# Patient Record
Sex: FEMALE | Race: OTHER | HISPANIC OR LATINO | URBAN - METROPOLITAN AREA
[De-identification: names, ages, dates, MRNs, and addresses within clinical notes are randomized per-mention and may not be internally consistent; named-entity substitution may affect disease eponyms.]

---

## 2024-02-23 ENCOUNTER — APPOINTMENT (RX ONLY)
Dept: URBAN - METROPOLITAN AREA CLINIC 187 | Facility: CLINIC | Age: 54
Setting detail: DERMATOLOGY
End: 2024-02-23

## 2024-02-23 DIAGNOSIS — L65.9 NONSCARRING HAIR LOSS, UNSPECIFIED: ICD-10-CM | Status: WORSENING

## 2024-02-23 DIAGNOSIS — L81.4 OTHER MELANIN HYPERPIGMENTATION: ICD-10-CM | Status: UNCHANGED

## 2024-02-23 PROCEDURE — ? PRESCRIPTION MEDICATION MANAGEMENT

## 2024-02-23 PROCEDURE — ? LAB REPORTS REVIEWED

## 2024-02-23 PROCEDURE — ? OBSERVATION

## 2024-02-23 PROCEDURE — ? COUNSELING

## 2024-02-23 PROCEDURE — ? ORDER TESTS

## 2024-02-23 PROCEDURE — ? DIAGNOSIS COMMENT

## 2024-02-23 PROCEDURE — 99204 OFFICE O/P NEW MOD 45 MIN: CPT

## 2024-02-23 PROCEDURE — ? PHOTO-DOCUMENTATION

## 2024-02-23 ASSESSMENT — LOCATION DETAILED DESCRIPTION DERM
LOCATION DETAILED: LEFT SUPERIOR PARIETAL SCALP
LOCATION DETAILED: LEFT NASAL ALA

## 2024-02-23 ASSESSMENT — LOCATION SIMPLE DESCRIPTION DERM
LOCATION SIMPLE: SCALP
LOCATION SIMPLE: LEFT NOSE

## 2024-02-23 ASSESSMENT — LOCATION ZONE DERM
LOCATION ZONE: SCALP
LOCATION ZONE: NOSE

## 2024-02-23 NOTE — PROCEDURE: ORDER TESTS
Clinical Notes (To The Lab): Written order given to patient
Bill For Surgical Tray: no
Billing Type: Patient Bill
Expected Date Of Service: 02/23/2024
Performing Laboratory: 0

## 2024-02-23 NOTE — PROCEDURE: PRESCRIPTION MEDICATION MANAGEMENT
Continue Regimen: Minoxidil 5% foam 1-2 x daily\\nBiotin 10,000 mincrograms
Detail Level: Zone
Plan: After labs are reviewed, will send oral medication. Oral Minoxidil vs finasteride.
Render In Strict Bullet Format?: No

## 2024-02-23 NOTE — HPI: HAIR LOSS
Previous Labs: Yes
How Severe Is Your Hair Loss?: moderate
When Were The Labs Drawn? (Drawn...): One month ago by PCP
Lab Details: Patient reports TSA levels go up and down

## 2024-02-23 NOTE — PROCEDURE: DIAGNOSIS COMMENT
Comment: Patient reports going under anesthesia and COVID within the last years. Denies history of anemia or increase/decrease 15 pounds or more.
Render Risk Assessment In Note?: no
Detail Level: Zone

## 2024-02-23 NOTE — PROCEDURE: LAB REPORTS REVIEWED
Summarized Lab Results: Patient showed previous lab work from a month ago ordered by PCP.
Detail Level: Zone